# Patient Record
Sex: MALE | Race: WHITE
[De-identification: names, ages, dates, MRNs, and addresses within clinical notes are randomized per-mention and may not be internally consistent; named-entity substitution may affect disease eponyms.]

---

## 2017-01-18 ENCOUNTER — HOSPITAL ENCOUNTER (OUTPATIENT)
Dept: HOSPITAL 83 - LAB | Age: 82
Discharge: HOME | End: 2017-01-18
Attending: DERMATOLOGY
Payer: MEDICARE

## 2017-01-18 DIAGNOSIS — L82.0: ICD-10-CM

## 2017-01-18 DIAGNOSIS — L30.8: Primary | ICD-10-CM

## 2017-01-18 DIAGNOSIS — L29.8: ICD-10-CM

## 2017-01-18 LAB
ALBUMIN SERPL-MCNC: 3.7 GM/DL (ref 3.1–4.5)
ALBUMIN SERPL-MCNC: NEGATIVE G/DL
ALP SERPL-CCNC: 92 U/L (ref 45–117)
ALT SERPL W P-5'-P-CCNC: 24 U/L (ref 12–78)
APPEARANCE UR: (no result)
AST SERPL-CCNC: 11 IU/L (ref 3–35)
BACTERIA #/AREA URNS HPF: (no result) /[HPF]
BASOPHILS # BLD AUTO: 0.1 10*3/UL (ref 0–0.1)
BASOPHILS NFR BLD AUTO: 0.7 % (ref 0–1)
BILIRUB UR QL STRIP: NEGATIVE
BUN SERPL-MCNC: 22 MG/DL (ref 7–24)
CHLORIDE SERPL-SCNC: 107 MMOL/L (ref 98–107)
CO2 SERPL-SCNC: 28 MMOL/L (ref 21–32)
COLOR UR: YELLOW
EOSINOPHIL # BLD AUTO: 0.5 10*3/UL (ref 0–0.4)
EOSINOPHIL # BLD AUTO: 4.7 % (ref 1–4)
EPI CELLS #/AREA URNS HPF: (no result) /[HPF]
ERYTHROCYTE [DISTWIDTH] IN BLOOD BY AUTOMATED COUNT: 13.2 % (ref 0–14.5)
GLUCOSE SERPL-MCNC: 112 MG/DL (ref 65–99)
GLUCOSE UR QL: NEGATIVE
HCT VFR BLD AUTO: 44.2 % (ref 42–52)
HGB BLD-MCNC: 14.4 G/DL (ref 14–18)
HGB UR QL STRIP: NEGATIVE
IG #: 0.1 10*3/UL (ref 0–0.1)
KETONES UR QL STRIP: NEGATIVE
LEUKOCYTE ESTERASE UR QL STRIP: NEGATIVE
LYMPHOCYTES # BLD AUTO: 2.3 10*3/UL (ref 1.3–4.4)
LYMPHOCYTES NFR BLD AUTO: 23 % (ref 27–41)
MCH RBC QN AUTO: 31.9 PG (ref 27–31)
MCHC RBC AUTO-ENTMCNC: 32.6 G/DL (ref 33–37)
MCV RBC AUTO: 97.8 FL (ref 80–94)
MONOCYTES # BLD AUTO: 1 10*3/UL (ref 0.1–1)
MONOCYTES NFR BLD MANUAL: 9.9 % (ref 3–9)
NEUT #: 6.2 10*3/UL (ref 2.3–7.9)
NEUT %: 61.2 % (ref 47–73)
NITRITE UR QL STRIP: NEGATIVE
NRBC BLD QL AUTO: 0 % (ref 0–0)
PH UR STRIP: 5 [PH] (ref 5–9)
PLATELET # BLD AUTO: 225 10*3/UL (ref 130–400)
PMV BLD AUTO: 10.9 FL (ref 9.6–12.3)
POTASSIUM SERPL-SCNC: 4.2 MMOL/L (ref 3.5–5.1)
PROT SERPL-MCNC: 7 GM/DL (ref 6.4–8.2)
RBC # BLD AUTO: 4.52 10*6/UL (ref 4.5–5.9)
SODIUM SERPL-SCNC: 144 MMOL/L (ref 136–145)
SP GR UR: >= 1.03 (ref 1–1.03)
UROBILINOGEN UR STRIP-MCNC: 0.2 E.U./DL (ref 0.2–1)
WBC #/AREA URNS HPF: (no result) WBC/HPF (ref 0–5)
WBC NRBC COR # BLD AUTO: 10.2 10*3/UL (ref 4.8–10.8)

## 2017-01-19 LAB
ALBUMIN SERPL ELPH-MCNC: 3.6 G/DL (ref 2.9–4.4)
ALBUMIN/GLOB SERPL: 1.3 {RATIO} (ref 0.7–1.7)
ALPHA1 GLOB SERPL ELPH-MCNC: 0.2 G/DL (ref 0–0.4)
B-GLOBULIN SERPL ELPH-MCNC: 1 G/DL (ref 0.7–1.3)
GAMMA GLOB SERPL ELPH-MCNC: 0.6 G/DL (ref 0.4–1.8)
GLOBULIN SER CALC-MCNC: 2.8 G/DL (ref 2.2–3.9)
PROT SERPL-MCNC: 6.4 G/DL (ref 6–8.5)

## 2017-01-23 ENCOUNTER — HOSPITAL ENCOUNTER (OUTPATIENT)
Dept: HOSPITAL 83 - LAB | Age: 82
Discharge: HOME | End: 2017-01-23
Attending: DERMATOLOGY
Payer: MEDICARE

## 2017-01-23 DIAGNOSIS — Z79.899: Primary | ICD-10-CM

## 2017-05-13 ENCOUNTER — HOSPITAL ENCOUNTER (OUTPATIENT)
Dept: HOSPITAL 83 - LAB | Age: 82
Discharge: HOME | End: 2017-05-13
Attending: PHYSICIAN ASSISTANT
Payer: MEDICARE

## 2017-05-13 DIAGNOSIS — L30.9: Primary | ICD-10-CM

## 2017-05-15 LAB
ALBUMIN 24H MFR UR ELPH: 22.5 %
ALPHA1 GLOB 24H MFR UR ELPH: 2.3 %
GAMMA GLOB 24H MFR UR ELPH: 21 %
M PROTEIN 24H MFR UR ELPH: 25 MG/24 HR
PROT UR STRIP.AUTO-MCNC: 125 MG/24 HR (ref 30–150)
PROT UR-MCNC: 13.9 MG/DL

## 2017-06-01 ENCOUNTER — HOSPITAL ENCOUNTER (OUTPATIENT)
Dept: HOSPITAL 83 - LAB | Age: 82
Discharge: HOME | End: 2017-06-01
Attending: PHYSICIAN ASSISTANT
Payer: MEDICARE

## 2017-06-01 DIAGNOSIS — L30.9: Primary | ICD-10-CM

## 2017-06-02 LAB
KAPPA LC FREE SER-MCNC: 25.7 MG/L (ref 3.3–19.4)
KAPPA LC FREE/LAMBDA FREE SER: 1.37 {RATIO} (ref 0.26–1.65)
LAMBDA LC FREE SERPL-MCNC: 18.8 MG/L (ref 5.71–26.3)

## 2017-07-03 ENCOUNTER — HOSPITAL ENCOUNTER (OUTPATIENT)
Dept: HOSPITAL 83 - LAB | Age: 82
Discharge: HOME | End: 2017-07-03
Attending: PHYSICIAN ASSISTANT
Payer: MEDICARE

## 2017-07-03 DIAGNOSIS — Z79.899: ICD-10-CM

## 2017-07-03 DIAGNOSIS — L30.9: Primary | ICD-10-CM

## 2017-07-03 LAB
ALBUMIN SERPL-MCNC: 3.5 GM/DL (ref 3.1–4.5)
ALP SERPL-CCNC: 56 U/L (ref 45–117)
ALT SERPL W P-5'-P-CCNC: 18 U/L (ref 12–78)
AST SERPL-CCNC: 10 IU/L (ref 3–35)
BASOPHILS # BLD AUTO: 0 10*3/UL (ref 0–0.1)
BASOPHILS NFR BLD AUTO: 0.4 % (ref 0–1)
BUN SERPL-MCNC: 34 MG/DL (ref 7–24)
CHLORIDE SERPL-SCNC: 107 MMOL/L (ref 98–107)
CO2 SERPL-SCNC: 28 MMOL/L (ref 21–32)
EOSINOPHIL # BLD AUTO: 0.4 10*3/UL (ref 0–0.4)
EOSINOPHIL # BLD AUTO: 3.6 % (ref 1–4)
ERYTHROCYTE [DISTWIDTH] IN BLOOD BY AUTOMATED COUNT: 13.2 % (ref 0–14.5)
GLUCOSE SERPL-MCNC: 94 MG/DL (ref 65–99)
HCT VFR BLD AUTO: 39.4 % (ref 42–52)
HGB BLD-MCNC: 12.7 G/DL (ref 14–18)
IG #: 0.1 10*3/UL (ref 0–0.1)
LYMPHOCYTES # BLD AUTO: 2.7 10*3/UL (ref 1.3–4.4)
LYMPHOCYTES NFR BLD AUTO: 26.9 % (ref 27–41)
MCH RBC QN AUTO: 32 PG (ref 27–31)
MCHC RBC AUTO-ENTMCNC: 32.2 G/DL (ref 33–37)
MCV RBC AUTO: 99.2 FL (ref 80–94)
MONOCYTES # BLD AUTO: 1.1 10*3/UL (ref 0.1–1)
MONOCYTES NFR BLD MANUAL: 11.1 % (ref 3–9)
NEUT #: 5.8 10*3/UL (ref 2.3–7.9)
NEUT %: 57.4 % (ref 47–73)
NRBC BLD QL AUTO: 0 10*3/UL (ref 0–0)
PLATELET # BLD AUTO: 217 10*3/UL (ref 130–400)
PMV BLD AUTO: 9.9 FL (ref 9.6–12.3)
POTASSIUM SERPL-SCNC: 4.7 MMOL/L (ref 3.5–5.1)
PROT SERPL-MCNC: 6.1 GM/DL (ref 6.4–8.2)
RBC # BLD AUTO: 3.97 10*6/UL (ref 4.5–5.9)
SODIUM SERPL-SCNC: 143 MMOL/L (ref 136–145)
WBC NRBC COR # BLD AUTO: 10.1 10*3/UL (ref 4.8–10.8)

## 2017-07-07 ENCOUNTER — HOSPITAL ENCOUNTER (EMERGENCY)
Dept: HOSPITAL 83 - ED | Age: 82
Discharge: HOME | End: 2017-07-07
Payer: MEDICARE

## 2017-07-07 VITALS — WEIGHT: 240 LBS | HEIGHT: 70 IN | BODY MASS INDEX: 34.36 KG/M2
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
## 2017-07-07 DIAGNOSIS — Z96.653: ICD-10-CM

## 2017-07-07 DIAGNOSIS — Z90.49: ICD-10-CM

## 2017-07-07 DIAGNOSIS — S22.31XA: Primary | ICD-10-CM
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
## 2017-07-07 DIAGNOSIS — S51.011A: ICD-10-CM

## 2017-07-07 DIAGNOSIS — Z79.82: ICD-10-CM
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
## 2017-07-07 DIAGNOSIS — M47.812: ICD-10-CM

## 2017-07-07 DIAGNOSIS — Z79.899: ICD-10-CM

## 2017-08-08 ENCOUNTER — HOSPITAL ENCOUNTER (OUTPATIENT)
Dept: HOSPITAL 83 - LAB | Age: 82
End: 2017-08-08
Attending: PHYSICIAN ASSISTANT
Payer: MEDICARE

## 2017-08-08 DIAGNOSIS — L30.9: Primary | ICD-10-CM

## 2017-08-08 LAB
ALBUMIN SERPL-MCNC: 3.6 GM/DL (ref 3.1–4.5)
ALP SERPL-CCNC: 66 U/L (ref 45–117)
ALT SERPL W P-5'-P-CCNC: 25 U/L (ref 12–78)
AST SERPL-CCNC: 15 IU/L (ref 3–35)
BASOPHILS # BLD AUTO: 0 10*3/UL (ref 0–0.1)
BASOPHILS NFR BLD AUTO: 0.3 % (ref 0–1)
BUN SERPL-MCNC: 28 MG/DL (ref 7–24)
CHLORIDE SERPL-SCNC: 107 MMOL/L (ref 98–107)
CO2 SERPL-SCNC: 25 MMOL/L (ref 21–32)
EOSINOPHIL # BLD AUTO: 0.4 10*3/UL (ref 0–0.4)
EOSINOPHIL # BLD AUTO: 3.8 % (ref 1–4)
ERYTHROCYTE [DISTWIDTH] IN BLOOD BY AUTOMATED COUNT: 13.2 % (ref 0–14.5)
GLUCOSE SERPL-MCNC: 107 MG/DL (ref 65–99)
HCT VFR BLD AUTO: 40.2 % (ref 42–52)
HGB BLD-MCNC: 13.1 G/DL (ref 14–18)
IG #: 0.1 10*3/UL (ref 0–0.1)
LYMPHOCYTES # BLD AUTO: 2.2 10*3/UL (ref 1.3–4.4)
LYMPHOCYTES NFR BLD AUTO: 23.5 % (ref 27–41)
MCH RBC QN AUTO: 31.4 PG (ref 27–31)
MCHC RBC AUTO-ENTMCNC: 32.6 G/DL (ref 33–37)
MCV RBC AUTO: 96.4 FL (ref 80–94)
MONOCYTES # BLD AUTO: 1 10*3/UL (ref 0.1–1)
MONOCYTES NFR BLD MANUAL: 10.9 % (ref 3–9)
NEUT #: 5.7 10*3/UL (ref 2.3–7.9)
NEUT %: 61 % (ref 47–73)
NRBC BLD QL AUTO: 0 % (ref 0–0)
PLATELET # BLD AUTO: 218 10*3/UL (ref 130–400)
PMV BLD AUTO: 10.5 FL (ref 9.6–12.3)
POTASSIUM SERPL-SCNC: 4.6 MMOL/L (ref 3.5–5.1)
PROT SERPL-MCNC: 6.6 GM/DL (ref 6.4–8.2)
RBC # BLD AUTO: 4.17 10*6/UL (ref 4.5–5.9)
SODIUM SERPL-SCNC: 139 MMOL/L (ref 136–145)
WBC NRBC COR # BLD AUTO: 9.3 10*3/UL (ref 4.8–10.8)

## 2017-09-08 ENCOUNTER — HOSPITAL ENCOUNTER (OUTPATIENT)
Dept: HOSPITAL 83 - LAB | Age: 82
Discharge: HOME | End: 2017-09-08
Attending: PHYSICIAN ASSISTANT
Payer: MEDICARE

## 2017-09-08 DIAGNOSIS — Z79.899: ICD-10-CM

## 2017-09-08 DIAGNOSIS — L30.9: Primary | ICD-10-CM

## 2017-09-08 LAB
ALBUMIN SERPL-MCNC: 3.5 GM/DL (ref 3.1–4.5)
ALP SERPL-CCNC: 58 U/L (ref 45–117)
ALT SERPL W P-5'-P-CCNC: 23 U/L (ref 12–78)
AST SERPL-CCNC: 12 IU/L (ref 3–35)
BASOPHILS # BLD AUTO: 0.1 10*3/UL (ref 0–0.1)
BASOPHILS NFR BLD AUTO: 0.4 % (ref 0–1)
BUN SERPL-MCNC: 34 MG/DL (ref 7–24)
CHLORIDE SERPL-SCNC: 106 MMOL/L (ref 98–107)
CREAT SERPL-MCNC: 1.5 MG/DL (ref 0.7–1.3)
EOSINOPHIL # BLD AUTO: 0.2 10*3/UL (ref 0–0.4)
EOSINOPHIL # BLD AUTO: 1.8 % (ref 1–4)
ERYTHROCYTE [DISTWIDTH] IN BLOOD BY AUTOMATED COUNT: 14 % (ref 0–14.5)
HCT VFR BLD AUTO: 42 % (ref 42–52)
HGB BLD-MCNC: 13.4 G/DL (ref 14–18)
LYMPHOCYTES # BLD AUTO: 3.1 10*3/UL (ref 1.3–4.4)
LYMPHOCYTES NFR BLD AUTO: 24.1 % (ref 27–41)
MCH RBC QN AUTO: 30.9 PG (ref 27–31)
MCHC RBC AUTO-ENTMCNC: 31.9 G/DL (ref 33–37)
MCV RBC AUTO: 97 FL (ref 80–94)
MONOCYTES # BLD AUTO: 1.4 10*3/UL (ref 0.1–1)
MONOCYTES NFR BLD MANUAL: 10.8 % (ref 3–9)
NEUT #: 8 10*3/UL (ref 2.3–7.9)
NEUT %: 62.1 % (ref 47–73)
NRBC BLD QL AUTO: 0 % (ref 0–0)
PLATELET # BLD AUTO: 207 10*3/UL (ref 130–400)
PMV BLD AUTO: 10.4 FL (ref 9.6–12.3)
POTASSIUM SERPL-SCNC: 4.8 MMOL/L (ref 3.5–5.1)
PROT SERPL-MCNC: 6.5 GM/DL (ref 6.4–8.2)
RBC # BLD AUTO: 4.33 10*6/UL (ref 4.5–5.9)
SODIUM SERPL-SCNC: 140 MMOL/L (ref 136–145)
WBC NRBC COR # BLD AUTO: 12.8 10*3/UL (ref 4.8–10.8)

## 2017-11-15 ENCOUNTER — HOSPITAL ENCOUNTER (OUTPATIENT)
Dept: HOSPITAL 83 - IR | Age: 82
Discharge: HOME | End: 2017-11-15
Attending: PHYSICIAN ASSISTANT
Payer: MEDICARE

## 2017-11-15 VITALS — DIASTOLIC BLOOD PRESSURE: 69 MMHG

## 2017-11-15 VITALS — DIASTOLIC BLOOD PRESSURE: 79 MMHG

## 2017-11-15 VITALS — SYSTOLIC BLOOD PRESSURE: 155 MMHG | DIASTOLIC BLOOD PRESSURE: 68 MMHG

## 2017-11-15 VITALS — DIASTOLIC BLOOD PRESSURE: 74 MMHG

## 2017-11-15 VITALS — DIASTOLIC BLOOD PRESSURE: 78 MMHG

## 2017-11-15 VITALS — DIASTOLIC BLOOD PRESSURE: 70 MMHG

## 2017-11-15 VITALS — DIASTOLIC BLOOD PRESSURE: 72 MMHG

## 2017-11-15 VITALS — DIASTOLIC BLOOD PRESSURE: 62 MMHG

## 2017-11-15 DIAGNOSIS — M48.061: Primary | ICD-10-CM

## 2017-11-15 DIAGNOSIS — I10: ICD-10-CM

## 2017-11-15 DIAGNOSIS — M54.16: ICD-10-CM

## 2017-11-15 DIAGNOSIS — M12.9: ICD-10-CM

## 2017-11-15 DIAGNOSIS — M47.896: ICD-10-CM

## 2017-11-15 LAB
APTT PPP: 22.8 SECONDS (ref 20.8–31.5)
INR BLD: 1 (ref 2–3.5)

## 2017-12-07 ENCOUNTER — HOSPITAL ENCOUNTER (OUTPATIENT)
Dept: HOSPITAL 83 - LAB | Age: 82
Discharge: HOME | End: 2017-12-07
Attending: SPECIALIST
Payer: MEDICARE

## 2017-12-07 DIAGNOSIS — Z79.899: ICD-10-CM

## 2017-12-07 DIAGNOSIS — L30.9: Primary | ICD-10-CM

## 2017-12-07 LAB
ALBUMIN SERPL-MCNC: 3.4 GM/DL (ref 3.1–4.5)
ALP SERPL-CCNC: 71 U/L (ref 45–117)
ALT SERPL W P-5'-P-CCNC: 23 U/L (ref 12–78)
AST SERPL-CCNC: 9 IU/L (ref 3–35)
BASOPHILS # BLD AUTO: 0.1 10*3/UL (ref 0–0.1)
BASOPHILS NFR BLD AUTO: 0.6 % (ref 0–1)
BUN SERPL-MCNC: 25 MG/DL (ref 7–24)
CHLORIDE SERPL-SCNC: 108 MMOL/L (ref 98–107)
CREAT SERPL-MCNC: 1.31 MG/DL (ref 0.7–1.3)
EOSINOPHIL # BLD AUTO: 0.4 10*3/UL (ref 0–0.4)
EOSINOPHIL # BLD AUTO: 3.4 % (ref 1–4)
ERYTHROCYTE [DISTWIDTH] IN BLOOD BY AUTOMATED COUNT: 13.2 % (ref 0–14.5)
HCT VFR BLD AUTO: 39.5 % (ref 42–52)
HGB BLD-MCNC: 12.9 G/DL (ref 14–18)
LYMPHOCYTES # BLD AUTO: 2.4 10*3/UL (ref 1.3–4.4)
LYMPHOCYTES NFR BLD AUTO: 22.1 % (ref 27–41)
MCH RBC QN AUTO: 31.7 PG (ref 27–31)
MCHC RBC AUTO-ENTMCNC: 32.7 G/DL (ref 33–37)
MCV RBC AUTO: 97.1 FL (ref 80–94)
MONOCYTES # BLD AUTO: 1.3 10*3/UL (ref 0.1–1)
MONOCYTES NFR BLD MANUAL: 11.7 % (ref 3–9)
NEUT #: 6.7 10*3/UL (ref 2.3–7.9)
NEUT %: 61.6 % (ref 47–73)
NRBC BLD QL AUTO: 0 10*3/UL (ref 0–0)
PLATELET # BLD AUTO: 208 10*3/UL (ref 130–400)
PMV BLD AUTO: 10.5 FL (ref 9.6–12.3)
POTASSIUM SERPL-SCNC: 4.3 MMOL/L (ref 3.5–5.1)
PROT SERPL-MCNC: 6.5 GM/DL (ref 6.4–8.2)
RBC # BLD AUTO: 4.07 10*6/UL (ref 4.5–5.9)
SODIUM SERPL-SCNC: 144 MMOL/L (ref 136–145)
WBC NRBC COR # BLD AUTO: 10.8 10*3/UL (ref 4.8–10.8)

## 2018-01-03 ENCOUNTER — HOSPITAL ENCOUNTER (OUTPATIENT)
Dept: HOSPITAL 83 - LAB | Age: 83
Discharge: HOME | End: 2018-01-03
Attending: UROLOGY
Payer: COMMERCIAL

## 2018-01-03 DIAGNOSIS — I10: Primary | ICD-10-CM

## 2018-01-03 DIAGNOSIS — D40.0: ICD-10-CM

## 2018-01-03 LAB
ALBUMIN SERPL-MCNC: 3.5 GM/DL (ref 3.1–4.5)
ALP SERPL-CCNC: 72 U/L (ref 45–117)
ALT SERPL W P-5'-P-CCNC: 28 U/L (ref 12–78)
AST SERPL-CCNC: 12 IU/L (ref 3–35)
BASOPHILS # BLD AUTO: 0.1 10*3/UL (ref 0–0.1)
BASOPHILS NFR BLD AUTO: 0.4 % (ref 0–1)
BUN SERPL-MCNC: 30 MG/DL (ref 7–24)
CHLORIDE SERPL-SCNC: 105 MMOL/L (ref 98–107)
CREAT SERPL-MCNC: 1.47 MG/DL (ref 0.7–1.3)
EOSINOPHIL # BLD AUTO: 0.4 10*3/UL (ref 0–0.4)
EOSINOPHIL # BLD AUTO: 3.6 % (ref 1–4)
ERYTHROCYTE [DISTWIDTH] IN BLOOD BY AUTOMATED COUNT: 12.8 % (ref 0–14.5)
HCT VFR BLD AUTO: 41 % (ref 42–52)
HGB BLD-MCNC: 13.5 G/DL (ref 14–18)
LYMPHOCYTES # BLD AUTO: 2.4 10*3/UL (ref 1.3–4.4)
LYMPHOCYTES NFR BLD AUTO: 19.8 % (ref 27–41)
MCH RBC QN AUTO: 31.7 PG (ref 27–31)
MCHC RBC AUTO-ENTMCNC: 32.9 G/DL (ref 33–37)
MCV RBC AUTO: 96.2 FL (ref 80–94)
MONOCYTES # BLD AUTO: 1.1 10*3/UL (ref 0.1–1)
MONOCYTES NFR BLD MANUAL: 8.9 % (ref 3–9)
NEUT #: 8 10*3/UL (ref 2.3–7.9)
NEUT %: 66.5 % (ref 47–73)
NRBC BLD QL AUTO: 0 10*3/UL (ref 0–0)
PLATELET # BLD AUTO: 207 10*3/UL (ref 130–400)
PMV BLD AUTO: 10.5 FL (ref 9.6–12.3)
POTASSIUM SERPL-SCNC: 4.4 MMOL/L (ref 3.5–5.1)
PROT SERPL-MCNC: 6.7 GM/DL (ref 6.4–8.2)
RBC # BLD AUTO: 4.26 10*6/UL (ref 4.5–5.9)
SODIUM SERPL-SCNC: 140 MMOL/L (ref 136–145)
WBC NRBC COR # BLD AUTO: 12 10*3/UL (ref 4.8–10.8)

## 2018-01-18 ENCOUNTER — HOSPITAL ENCOUNTER (OUTPATIENT)
Dept: HOSPITAL 83 - CT | Age: 83
Discharge: HOME | End: 2018-01-18
Attending: UROLOGY
Payer: MEDICARE

## 2018-01-18 DIAGNOSIS — K57.30: Primary | ICD-10-CM

## 2018-01-18 DIAGNOSIS — N20.0: ICD-10-CM

## 2018-03-15 ENCOUNTER — HOSPITAL ENCOUNTER (OUTPATIENT)
Dept: HOSPITAL 83 - LAB | Age: 83
Discharge: HOME | End: 2018-03-15
Attending: SPECIALIST
Payer: MEDICARE

## 2018-03-15 DIAGNOSIS — L30.9: Primary | ICD-10-CM

## 2019-01-07 ENCOUNTER — HOSPITAL ENCOUNTER (OUTPATIENT)
Dept: HOSPITAL 83 - LAB | Age: 84
Discharge: HOME | End: 2019-01-07
Attending: NURSE PRACTITIONER
Payer: MEDICARE

## 2019-01-07 DIAGNOSIS — I10: ICD-10-CM

## 2019-01-07 DIAGNOSIS — Z12.5: Primary | ICD-10-CM

## 2019-01-07 DIAGNOSIS — N28.89: ICD-10-CM

## 2019-01-07 LAB
ALBUMIN SERPL-MCNC: 3.6 GM/DL (ref 3.1–4.5)
ALP SERPL-CCNC: 80 U/L (ref 45–117)
ALT SERPL W P-5'-P-CCNC: 35 U/L (ref 12–78)
AST SERPL-CCNC: 12 IU/L (ref 3–35)
BASOPHILS # BLD AUTO: 0.1 10*3/UL (ref 0–0.1)
BASOPHILS NFR BLD AUTO: 0.4 % (ref 0–1)
BUN SERPL-MCNC: 46 MG/DL (ref 7–24)
CHLORIDE SERPL-SCNC: 108 MMOL/L (ref 98–107)
CREAT SERPL-MCNC: 1.72 MG/DL (ref 0.7–1.3)
EOSINOPHIL # BLD AUTO: 0.4 10*3/UL (ref 0–0.4)
EOSINOPHIL # BLD AUTO: 3.6 % (ref 1–4)
ERYTHROCYTE [DISTWIDTH] IN BLOOD BY AUTOMATED COUNT: 13.2 % (ref 0–14.5)
HCT VFR BLD AUTO: 42.4 % (ref 42–52)
HGB BLD-MCNC: 13.9 G/DL (ref 14–18)
LYMPHOCYTES # BLD AUTO: 2.4 10*3/UL (ref 1.3–4.4)
LYMPHOCYTES NFR BLD AUTO: 20 % (ref 27–41)
MCH RBC QN AUTO: 32.7 PG (ref 27–31)
MCHC RBC AUTO-ENTMCNC: 32.8 G/DL (ref 33–37)
MCV RBC AUTO: 99.8 FL (ref 80–94)
MONOCYTES # BLD AUTO: 1.1 10*3/UL (ref 0.1–1)
MONOCYTES NFR BLD MANUAL: 9.2 % (ref 3–9)
NEUT #: 8 10*3/UL (ref 2.3–7.9)
NEUT %: 66.3 % (ref 47–73)
NRBC BLD QL AUTO: 0 % (ref 0–0)
PLATELET # BLD AUTO: 215 10*3/UL (ref 130–400)
PMV BLD AUTO: 10.3 FL (ref 9.6–12.3)
POTASSIUM SERPL-SCNC: 5.2 MMOL/L (ref 3.5–5.1)
PROT SERPL-MCNC: 6.7 GM/DL (ref 6.4–8.2)
RBC # BLD AUTO: 4.25 10*6/UL (ref 4.5–5.9)
SODIUM SERPL-SCNC: 141 MMOL/L (ref 136–145)
WBC NRBC COR # BLD AUTO: 12.1 10*3/UL (ref 4.8–10.8)

## 2019-01-08 LAB
PROSTATE SPECIFIC AG, SERUM: 0.2 NG/ML (ref 0–4)
RPR SER QL: 0.08 NG/ML

## 2019-03-27 ENCOUNTER — HOSPITAL ENCOUNTER (EMERGENCY)
Dept: HOSPITAL 83 - ED | Age: 84
Discharge: HOME | End: 2019-03-27
Payer: MEDICARE

## 2019-03-27 VITALS — WEIGHT: 242 LBS | BODY MASS INDEX: 34.65 KG/M2 | HEIGHT: 70 IN

## 2019-03-27 DIAGNOSIS — Z79.899: ICD-10-CM

## 2019-03-27 DIAGNOSIS — R51: ICD-10-CM

## 2019-03-27 DIAGNOSIS — R42: ICD-10-CM

## 2019-03-27 DIAGNOSIS — R05: Primary | ICD-10-CM

## 2019-03-27 DIAGNOSIS — Z90.49: ICD-10-CM

## 2019-03-27 DIAGNOSIS — Z79.82: ICD-10-CM

## 2019-03-27 DIAGNOSIS — R09.81: ICD-10-CM

## 2019-03-27 DIAGNOSIS — R09.89: ICD-10-CM

## 2019-03-27 LAB
ALBUMIN SERPL-MCNC: 3.2 GM/DL (ref 3.1–4.5)
ALP SERPL-CCNC: 81 U/L (ref 45–117)
ALT SERPL W P-5'-P-CCNC: 18 U/L (ref 12–78)
AST SERPL-CCNC: 10 IU/L (ref 3–35)
BASOPHILS # BLD AUTO: 1 % (ref 0–1)
BUN SERPL-MCNC: 25 MG/DL (ref 7–24)
CHLORIDE SERPL-SCNC: 108 MMOL/L (ref 98–107)
CREAT SERPL-MCNC: 1.37 MG/DL (ref 0.7–1.3)
ERYTHROCYTE [DISTWIDTH] IN BLOOD BY AUTOMATED COUNT: 13 % (ref 0–14.5)
HCT VFR BLD AUTO: 41.2 % (ref 42–52)
HGB BLD-MCNC: 13.3 G/DL (ref 14–18)
MACROCYTES BLD QL SMEAR: SLIGHT
MCH RBC QN AUTO: 32.4 PG (ref 27–31)
MCHC RBC AUTO-ENTMCNC: 32.3 G/DL (ref 33–37)
MCV RBC AUTO: 100.5 FL (ref 80–94)
NRBC BLD QL AUTO: 0 10*3/UL (ref 0–0)
PLATELET # BLD AUTO: 198 10*3/UL (ref 130–400)
PLATELET SUFFICIENCY: NORMAL
PMV BLD AUTO: 10.3 FL (ref 9.6–12.3)
POTASSIUM SERPL-SCNC: 4.4 MMOL/L (ref 3.5–5.1)
PROT SERPL-MCNC: 6.7 GM/DL (ref 6.4–8.2)
RBC # BLD AUTO: 4.1 10*6/UL (ref 4.5–5.9)
SODIUM SERPL-SCNC: 141 MMOL/L (ref 136–145)
TOTAL CELLS COUNTED: 100 #CELLS
TROPONIN I SERPL-MCNC: < 0.015 NG/ML (ref ?–0.04)
WBC NRBC COR # BLD AUTO: 8.9 10*3/UL (ref 4.8–10.8)

## 2019-04-05 ENCOUNTER — HOSPITAL ENCOUNTER (EMERGENCY)
Dept: HOSPITAL 83 - ED | Age: 84
Discharge: HOME | End: 2019-04-05
Payer: MEDICARE

## 2019-04-05 VITALS — WEIGHT: 236 LBS | HEIGHT: 70 IN | BODY MASS INDEX: 33.79 KG/M2

## 2019-04-05 DIAGNOSIS — J34.89: ICD-10-CM

## 2019-04-05 DIAGNOSIS — Z79.82: ICD-10-CM

## 2019-04-05 DIAGNOSIS — R05: Primary | ICD-10-CM

## 2019-04-05 DIAGNOSIS — Z79.899: ICD-10-CM

## 2019-04-05 DIAGNOSIS — Z90.49: ICD-10-CM

## 2019-04-05 DIAGNOSIS — R06.02: ICD-10-CM

## 2019-04-05 LAB
ALBUMIN SERPL-MCNC: 3 GM/DL (ref 3.1–4.5)
ALP SERPL-CCNC: 78 U/L (ref 45–117)
ALT SERPL W P-5'-P-CCNC: 33 U/L (ref 12–78)
AST SERPL-CCNC: 15 IU/L (ref 3–35)
BASOPHILS # BLD AUTO: 0.1 10*3/UL (ref 0–0.1)
BASOPHILS NFR BLD AUTO: 0.4 % (ref 0–1)
BUN SERPL-MCNC: 28 MG/DL (ref 7–24)
CHLORIDE SERPL-SCNC: 109 MMOL/L (ref 98–107)
CREAT SERPL-MCNC: 1.38 MG/DL (ref 0.7–1.3)
EOSINOPHIL # BLD AUTO: 1.1 10*3/UL (ref 0–0.4)
EOSINOPHIL # BLD AUTO: 7.5 % (ref 1–4)
ERYTHROCYTE [DISTWIDTH] IN BLOOD BY AUTOMATED COUNT: 12.6 % (ref 0–14.5)
HCT VFR BLD AUTO: 38.1 % (ref 42–52)
HGB BLD-MCNC: 12.5 G/DL (ref 14–18)
LYMPHOCYTES # BLD AUTO: 2.4 10*3/UL (ref 1.3–4.4)
LYMPHOCYTES NFR BLD AUTO: 16.6 % (ref 27–41)
MCH RBC QN AUTO: 32.6 PG (ref 27–31)
MCHC RBC AUTO-ENTMCNC: 32.8 G/DL (ref 33–37)
MCV RBC AUTO: 99.5 FL (ref 80–94)
MONOCYTES # BLD AUTO: 1.4 10*3/UL (ref 0.1–1)
MONOCYTES NFR BLD MANUAL: 10 % (ref 3–9)
NEUT #: 9.2 10*3/UL (ref 2.3–7.9)
NEUT %: 64.1 % (ref 47–73)
NRBC BLD QL AUTO: 0 % (ref 0–0)
PLATELET # BLD AUTO: 271 10*3/UL (ref 130–400)
PMV BLD AUTO: 9.9 FL (ref 9.6–12.3)
POTASSIUM SERPL-SCNC: 4.1 MMOL/L (ref 3.5–5.1)
PROT SERPL-MCNC: 6.7 GM/DL (ref 6.4–8.2)
RBC # BLD AUTO: 3.83 10*6/UL (ref 4.5–5.9)
SODIUM SERPL-SCNC: 143 MMOL/L (ref 136–145)
TROPONIN I SERPL-MCNC: < 0.015 NG/ML (ref ?–0.04)
WBC NRBC COR # BLD AUTO: 14.4 10*3/UL (ref 4.8–10.8)

## 2019-06-28 ENCOUNTER — HOSPITAL ENCOUNTER (INPATIENT)
Dept: HOSPITAL 83 - ED | Age: 84
LOS: 2 days | Discharge: HOME | DRG: 149 | End: 2019-06-30
Attending: EMERGENCY MEDICINE | Admitting: EMERGENCY MEDICINE
Payer: MEDICARE

## 2019-06-28 VITALS — DIASTOLIC BLOOD PRESSURE: 82 MMHG

## 2019-06-28 VITALS
SYSTOLIC BLOOD PRESSURE: 174 MMHG | WEIGHT: 238.5 LBS | HEIGHT: 70 IN | BODY MASS INDEX: 34.14 KG/M2 | DIASTOLIC BLOOD PRESSURE: 78 MMHG

## 2019-06-28 VITALS — DIASTOLIC BLOOD PRESSURE: 80 MMHG

## 2019-06-28 VITALS — DIASTOLIC BLOOD PRESSURE: 78 MMHG | SYSTOLIC BLOOD PRESSURE: 118 MMHG

## 2019-06-28 VITALS — DIASTOLIC BLOOD PRESSURE: 75 MMHG

## 2019-06-28 VITALS — DIASTOLIC BLOOD PRESSURE: 67 MMHG | SYSTOLIC BLOOD PRESSURE: 118 MMHG

## 2019-06-28 DIAGNOSIS — D53.9: ICD-10-CM

## 2019-06-28 DIAGNOSIS — K21.9: ICD-10-CM

## 2019-06-28 DIAGNOSIS — R73.03: ICD-10-CM

## 2019-06-28 DIAGNOSIS — R73.9: ICD-10-CM

## 2019-06-28 DIAGNOSIS — E86.0: ICD-10-CM

## 2019-06-28 DIAGNOSIS — E66.9: ICD-10-CM

## 2019-06-28 DIAGNOSIS — Z90.49: ICD-10-CM

## 2019-06-28 DIAGNOSIS — D72.829: ICD-10-CM

## 2019-06-28 DIAGNOSIS — Z82.49: ICD-10-CM

## 2019-06-28 DIAGNOSIS — F32.9: ICD-10-CM

## 2019-06-28 DIAGNOSIS — R42: Primary | ICD-10-CM

## 2019-06-28 DIAGNOSIS — Z96.653: ICD-10-CM

## 2019-06-28 DIAGNOSIS — Z85.828: ICD-10-CM

## 2019-06-28 DIAGNOSIS — E87.8: ICD-10-CM

## 2019-06-28 DIAGNOSIS — M47.812: ICD-10-CM

## 2019-06-28 DIAGNOSIS — R26.81: ICD-10-CM

## 2019-06-28 DIAGNOSIS — N18.3: ICD-10-CM

## 2019-06-28 DIAGNOSIS — Z87.891: ICD-10-CM

## 2019-06-28 DIAGNOSIS — J90: ICD-10-CM

## 2019-06-28 DIAGNOSIS — Z83.3: ICD-10-CM

## 2019-06-28 DIAGNOSIS — E44.0: ICD-10-CM

## 2019-06-28 DIAGNOSIS — I12.9: ICD-10-CM

## 2019-06-28 LAB
ALBUMIN SERPL-MCNC: 3.4 GM/DL (ref 3.1–4.5)
ALP SERPL-CCNC: 75 U/L (ref 45–117)
ALT SERPL W P-5'-P-CCNC: 21 U/L (ref 12–78)
APPEARANCE UR: CLEAR
APTT PPP: 24 SECONDS (ref 20–32.1)
AST SERPL-CCNC: 18 IU/L (ref 3–35)
BACTERIA #/AREA URNS HPF: (no result) /[HPF]
BASOPHILS # BLD AUTO: 0.1 10*3/UL (ref 0–0.1)
BASOPHILS NFR BLD AUTO: 0.5 % (ref 0–1)
BILIRUB UR QL STRIP: NEGATIVE
BUN SERPL-MCNC: 22 MG/DL (ref 7–24)
CHLORIDE SERPL-SCNC: 109 MMOL/L (ref 98–107)
COLOR UR: YELLOW
CREAT SERPL-MCNC: 1.33 MG/DL (ref 0.7–1.3)
EOSINOPHIL # BLD AUTO: 0.6 10*3/UL (ref 0–0.4)
EOSINOPHIL # BLD AUTO: 6.1 % (ref 1–4)
EPI CELLS #/AREA URNS HPF: (no result) /[HPF]
ERYTHROCYTE [DISTWIDTH] IN BLOOD BY AUTOMATED COUNT: 13.4 % (ref 0–14.5)
GLUCOSE UR QL: NEGATIVE
HCT VFR BLD AUTO: 40.8 % (ref 42–52)
HGB BLD-MCNC: 13.2 G/DL (ref 14–18)
HGB UR QL STRIP: NEGATIVE
INR BLD: 1 (ref 2–3.5)
KETONES UR QL STRIP: NEGATIVE
LEUKOCYTE ESTERASE UR QL STRIP: NEGATIVE
LYMPHOCYTES # BLD AUTO: 2.7 10*3/UL (ref 1.3–4.4)
LYMPHOCYTES NFR BLD AUTO: 26.3 % (ref 27–41)
MCH RBC QN AUTO: 31.5 PG (ref 27–31)
MCHC RBC AUTO-ENTMCNC: 32.4 G/DL (ref 33–37)
MCV RBC AUTO: 97.4 FL (ref 80–94)
MONOCYTES # BLD AUTO: 1.1 10*3/UL (ref 0.1–1)
MONOCYTES NFR BLD MANUAL: 11 % (ref 3–9)
NEUT #: 5.7 10*3/UL (ref 2.3–7.9)
NEUT %: 55.4 % (ref 47–73)
NITRITE UR QL STRIP: NEGATIVE
NRBC BLD QL AUTO: 0 10*3/UL (ref 0–0)
PH UR STRIP: 5.5 [PH] (ref 5–9)
PLATELET # BLD AUTO: 241 10*3/UL (ref 130–400)
PMV BLD AUTO: 10.3 FL (ref 9.6–12.3)
POTASSIUM SERPL-SCNC: 4.3 MMOL/L (ref 3.5–5.1)
PROT SERPL-MCNC: 6.7 GM/DL (ref 6.4–8.2)
RBC # BLD AUTO: 4.19 10*6/UL (ref 4.5–5.9)
RBC #/AREA URNS HPF: (no result) RBC/HPF (ref 0–2)
SODIUM SERPL-SCNC: 142 MMOL/L (ref 136–145)
SP GR UR: 1.02 (ref 1–1.03)
TROPONIN I SERPL-MCNC: < 0.015 NG/ML (ref ?–0.04)
UROBILINOGEN UR STRIP-MCNC: 0.2 E.U./DL (ref 0.2–1)
WBC #/AREA URNS HPF: (no result) WBC/HPF (ref 0–5)
WBC NRBC COR # BLD AUTO: 10.3 10*3/UL (ref 4.8–10.8)

## 2019-06-28 NOTE — NUR
A 89, admitted to , under the
services of JOHN Lui DO with a diagnosis of VERTIGO.
Chief complaint is DIZZINESS AND NAUSEA.
Patient arrived via ambulatory from ER.
Monitor applied. Initial assessment completed.
Vital signs taken and recorded.
JOHN LUI DO notified of admission to the unit.
Orders received.
See assessment for past medical history, medications
and allergies.
Patient and/or family oriented to unit. [facility]
visitation policy reviewed.
Clothing/patient valuable form completed.
 
MAGGY JADE

## 2019-06-28 NOTE — EKG
[place], [place]
 
                               ELECTROCARDIOGRAM REPORT
 
        NAME: SANDRA CLAY                ACCT #: [id number]  
        UNIT #: [id number]                        ROOM: 505       
        DOCTOR: WILLIAM DRAFT REPORT          BIRTHDATE: [birth year]
 
 
 

 
 
                           [facility]
                                       
Test Date:    2019               Test Time:    08:44:49
Pat Name:     SANDRA CLAY          Department:   
Patient ID:   [id number]             Room:         505
Gender:       M                        Technician:   
:          1930               Requested By: LORNA PALM
Order Number: [id number]      Reading MD:   Amilcar Bacon MD
                                 Measurements
Intervals                              Axis          
Rate:         72                       P:            -12
TX:           402                      QRS:          6
QRSD:         86                       T:            29
QT:           359                                    
QTc:          393                                    
                           Interpretive Statements
Sinus rhythm
Prolonged TX interval
Abnormal R-wave progression, early transition
Compared to ECG 2019 12:11:16
First degree AV block now present
Ventricular premature complex(es) no longer present
Sinus pause or arrest no longer present
 
Electronically Signed On 2019 14:57:52 PDT by Amilcar Bacon MD
 
CM:EKGRPT:ELECTROCARDIOGRAM REPORT
 
D: 19 0844
T: 19 1457
    
LORNA REYES DRAFT REPORT         
LORNA PALM DO

## 2019-06-28 NOTE — EKG
[place], [place]
 
                               ELECTROCARDIOGRAM REPORT
 
        NAME: SANDRA CLAY                ACCT #: [id number]  
        UNIT #: [id number]                        ROOM: 505       
        DOCTOR: WILLIAM DRAFT REPORT          BIRTHDATE: [birth year]
 
 
 

 
 
                           [facility]
                                       
Test Date:    2019               Test Time:    11:17:14
Pat Name:     SANDRA CLAY          Department:   
Patient ID:   [id number]             Room:         505
Gender:       M                        Technician:   Johanne Butterfield
:          1930               Requested By: LORNA PALM
Order Number: [id number]      Reading MD:   Amilcar Bacon MD
                                 Measurements
Intervals                              Axis          
Rate:         54                       P:            -37
ID:           425                      QRS:          1
QRSD:         89                       T:            22
QT:           394                                    
QTc:          374                                    
                           Interpretive Statements
Sinus rhythm
Prolonged ID interval
Abnormal R-wave progression, early transition
Compared to ECG 2019 12:11:16
First degree AV block now present
Ventricular premature complex(es) no longer present
Sinus pause or arrest no longer present
 
Electronically Signed On 2019 14:59:21 PDT by Amilcar Bacon MD
 
CM:EKGRPT:ELECTROCARDIOGRAM REPORT
 
D: 19 1117
T: 19 1459
    
LORNA REYES DRAFT REPORT         
LORNA PALM DO

## 2019-06-28 NOTE — EKG
[place], [place]
 
                               ELECTROCARDIOGRAM REPORT
 
        NAME: SANDRA CLAY                ACCT #: [id number]  
        UNIT #: [id number]                        ROOM: 505       
        DOCTOR: WILLIAM DRAFT REPORT          BIRTHDATE: [birth year]
 
 
 

 
 
                           [facility]
                                       
Test Date:    2019               Test Time:    15:07:55
Pat Name:     SANDRA CLAY          Department:   
Patient ID:   [id number]             Room:         505
Gender:       M                        Technician:   Johanne Butterfield
:          1930               Requested By: LORNA PALM
Order Number: [id number]      Reading MD:   Amilcar Bacon MD
                                 Measurements
Intervals                              Axis          
Rate:         91                       P:            
AZ:                                    QRS:          -13
QRSD:         82                       T:            28
QT:           344                                    
QTc:          424                                    
                           Interpretive Statements
Atrial fibrillation
Abnormal R-wave progression, early transition
Minimal ST depression, inferior leads
Baseline wander in lead(s) II,III,aVF
Compared to ECG 2019 12:11:16
ST (T wave) deviation now present
Sinus rhythm no longer present
Ventricular premature complex(es) no longer present
Sinus pause or arrest no longer present
 
Electronically Signed On 2019 15:00:31 PDT by Amilcar Bacon MD
 
CM:EKGRPT:ELECTROCARDIOGRAM REPORT
 
D: 19 1507
T: 19 1500
    
LORNA REYES DRAFT REPORT         
LORNA PALM DO

## 2019-06-29 VITALS — DIASTOLIC BLOOD PRESSURE: 60 MMHG

## 2019-06-29 VITALS — DIASTOLIC BLOOD PRESSURE: 58 MMHG | SYSTOLIC BLOOD PRESSURE: 126 MMHG

## 2019-06-29 VITALS — DIASTOLIC BLOOD PRESSURE: 42 MMHG

## 2019-06-29 VITALS — DIASTOLIC BLOOD PRESSURE: 62 MMHG | SYSTOLIC BLOOD PRESSURE: 122 MMHG

## 2019-06-29 VITALS — SYSTOLIC BLOOD PRESSURE: 130 MMHG | DIASTOLIC BLOOD PRESSURE: 60 MMHG

## 2019-06-29 LAB
25(OH)D3 SERPL-MCNC: 48.9 NG/ML (ref 30–100)
ALBUMIN SERPL-MCNC: 3 GM/DL (ref 3.1–4.5)
ALP SERPL-CCNC: 66 U/L (ref 45–117)
ALT SERPL W P-5'-P-CCNC: 21 U/L (ref 12–78)
AST SERPL-CCNC: 5 IU/L (ref 3–35)
BUN SERPL-MCNC: 25 MG/DL (ref 7–24)
CHLORIDE SERPL-SCNC: 112 MMOL/L (ref 98–107)
CHOLEST SERPL-MCNC: 191 MG/DL (ref ?–200)
CREAT SERPL-MCNC: 1.26 MG/DL (ref 0.7–1.3)
ERYTHROCYTE [DISTWIDTH] IN BLOOD BY AUTOMATED COUNT: 13.3 % (ref 0–14.5)
HCT VFR BLD AUTO: 36.3 % (ref 42–52)
HDLC SERPL-MCNC: 50 MG/DL (ref 40–60)
HGB BLD-MCNC: 11.6 G/DL (ref 14–18)
LDLC SERPL DIRECT ASSAY-MCNC: 111 MG/DL (ref 9–159)
MCH RBC QN AUTO: 31.4 PG (ref 27–31)
MCHC RBC AUTO-ENTMCNC: 32 G/DL (ref 33–37)
MCV RBC AUTO: 98.1 FL (ref 80–94)
NRBC BLD QL AUTO: 0 % (ref 0–0)
PHOSPHATE SERPL-MCNC: 2.6 MG/DL (ref 2.5–4.9)
PLATELET # BLD AUTO: 241 10*3/UL (ref 130–400)
PLATELET SUFFICIENCY: NORMAL
PMV BLD AUTO: 10.7 FL (ref 9.6–12.3)
POTASSIUM SERPL-SCNC: 4.3 MMOL/L (ref 3.5–5.1)
PROT SERPL-MCNC: 5.9 GM/DL (ref 6.4–8.2)
RBC # BLD AUTO: 3.7 10*6/UL (ref 4.5–5.9)
RBC MORPH BLD: NORMAL
SODIUM SERPL-SCNC: 143 MMOL/L (ref 136–145)
TOTAL CELLS COUNTED: 100 #CELLS
TRIGL SERPL-MCNC: 152 MG/DL (ref ?–150)
TSH SERPL DL<=0.005 MIU/L-ACNC: 0.51 UIU/ML (ref 0.36–4.75)
VITAMIN B12: 347 PG/ML (ref 247–911)
VLDLC SERPL CALC-MCNC: 30 MG/DL (ref 6–40)
WBC NRBC COR # BLD AUTO: 17.4 10*3/UL (ref 4.8–10.8)

## 2019-06-30 VITALS — SYSTOLIC BLOOD PRESSURE: 138 MMHG | DIASTOLIC BLOOD PRESSURE: 79 MMHG

## 2019-06-30 LAB
BASOPHILS # BLD AUTO: 0 10*3/UL (ref 0–0.1)
BASOPHILS NFR BLD AUTO: 0.1 % (ref 0–1)
BUN SERPL-MCNC: 29 MG/DL (ref 7–24)
CHLORIDE SERPL-SCNC: 112 MMOL/L (ref 98–107)
CREAT SERPL-MCNC: 1.33 MG/DL (ref 0.7–1.3)
EOSINOPHIL # BLD AUTO: 0 % (ref 1–4)
EOSINOPHIL # BLD AUTO: 0 10*3/UL (ref 0–0.4)
ERYTHROCYTE [DISTWIDTH] IN BLOOD BY AUTOMATED COUNT: 13.9 % (ref 0–14.5)
HCT VFR BLD AUTO: 36.5 % (ref 42–52)
HGB BLD-MCNC: 11.5 G/DL (ref 14–18)
LYMPHOCYTES # BLD AUTO: 1.2 10*3/UL (ref 1.3–4.4)
LYMPHOCYTES NFR BLD AUTO: 7.3 % (ref 27–41)
MCH RBC QN AUTO: 31.6 PG (ref 27–31)
MCHC RBC AUTO-ENTMCNC: 31.5 G/DL (ref 33–37)
MCV RBC AUTO: 100.3 FL (ref 80–94)
MONOCYTES # BLD AUTO: 0.6 10*3/UL (ref 0.1–1)
MONOCYTES NFR BLD MANUAL: 3.7 % (ref 3–9)
NEUT #: 14.6 10*3/UL (ref 2.3–7.9)
NEUT %: 88.2 % (ref 47–73)
NRBC BLD QL AUTO: 0 % (ref 0–0)
PLATELET # BLD AUTO: 220 10*3/UL (ref 130–400)
PMV BLD AUTO: 10.7 FL (ref 9.6–12.3)
POTASSIUM SERPL-SCNC: 5.1 MMOL/L (ref 3.5–5.1)
RBC # BLD AUTO: 3.64 10*6/UL (ref 4.5–5.9)
SODIUM SERPL-SCNC: 142 MMOL/L (ref 136–145)
WBC NRBC COR # BLD AUTO: 16.6 10*3/UL (ref 4.8–10.8)

## 2019-09-24 ENCOUNTER — HOSPITAL ENCOUNTER (EMERGENCY)
Dept: HOSPITAL 83 - ED | Age: 84
Discharge: ANOTHER HOSPITAL | End: 2019-09-24
Payer: MEDICARE

## 2019-09-24 VITALS — HEIGHT: 70 IN | BODY MASS INDEX: 33.5 KG/M2 | WEIGHT: 234 LBS

## 2019-09-24 DIAGNOSIS — S06.5X0A: Primary | ICD-10-CM
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
## 2019-09-24 DIAGNOSIS — F17.200: ICD-10-CM

## 2019-09-24 DIAGNOSIS — Z90.49: ICD-10-CM
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
## 2019-09-24 DIAGNOSIS — Z79.899: ICD-10-CM
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
## 2019-09-24 DIAGNOSIS — Z98.890: ICD-10-CM

## 2024-06-12 NOTE — NUR
CALLED . HOSPITALIST PROGRESS NOTE SAYS THAT MECLIZINE SHOULD BE
ORDERED BUT NEVER WAS. SAID HE WOULD LOOK AT THE CHART. 